# Patient Record
Sex: MALE | Race: OTHER | HISPANIC OR LATINO | URBAN - METROPOLITAN AREA
[De-identification: names, ages, dates, MRNs, and addresses within clinical notes are randomized per-mention and may not be internally consistent; named-entity substitution may affect disease eponyms.]

---

## 2020-02-02 ENCOUNTER — EMERGENCY (EMERGENCY)
Facility: HOSPITAL | Age: 60
LOS: 1 days | Discharge: HOME | End: 2020-02-02
Attending: EMERGENCY MEDICINE
Payer: MEDICAID

## 2020-02-02 VITALS
HEIGHT: 70 IN | DIASTOLIC BLOOD PRESSURE: 8 MMHG | SYSTOLIC BLOOD PRESSURE: 124 MMHG | HEART RATE: 50 BPM | RESPIRATION RATE: 18 BRPM | TEMPERATURE: 98 F | WEIGHT: 175.05 LBS | OXYGEN SATURATION: 97 %

## 2020-02-02 VITALS
RESPIRATION RATE: 18 BRPM | HEART RATE: 52 BPM | TEMPERATURE: 98 F | DIASTOLIC BLOOD PRESSURE: 82 MMHG | OXYGEN SATURATION: 97 % | SYSTOLIC BLOOD PRESSURE: 138 MMHG

## 2020-02-02 PROCEDURE — 99283 EMERGENCY DEPT VISIT LOW MDM: CPT

## 2020-02-02 RX ORDER — VALACYCLOVIR 500 MG/1
1000 TABLET, FILM COATED ORAL ONCE
Refills: 0 | Status: COMPLETED | OUTPATIENT
Start: 2020-02-02 | End: 2020-02-02

## 2020-02-02 RX ORDER — VALACYCLOVIR 500 MG/1
1 TABLET, FILM COATED ORAL
Qty: 21 | Refills: 0
Start: 2020-02-02 | End: 2020-02-08

## 2020-02-02 RX ORDER — IBUPROFEN 200 MG
600 TABLET ORAL ONCE
Refills: 0 | Status: COMPLETED | OUTPATIENT
Start: 2020-02-02 | End: 2020-02-02

## 2020-02-02 RX ADMIN — VALACYCLOVIR 1000 MILLIGRAM(S): 500 TABLET, FILM COATED ORAL at 14:09

## 2020-02-02 RX ADMIN — Medication 600 MILLIGRAM(S): at 14:09

## 2020-02-02 NOTE — ED PROVIDER NOTE - PATIENT PORTAL LINK FT
You can access the FollowMyHealth Patient Portal offered by [facility] by registering at the following website: [email or web address] By joining [email or web address] FollowMyHealth portal, you will also be able to view your health information using other applications (apps) compatible with our system.

## 2020-02-02 NOTE — ED PROVIDER NOTE - PHYSICAL EXAMINATION
Attending note. Rash in right flank-fascicular with a red base which our group consistent with shingles.

## 2020-02-02 NOTE — ED PROVIDER NOTE - OBJECTIVE STATEMENT
Attending note. Patient was seen in the fast track or room #4. She is complaining of rash on the right flank today. Patient started having itching and burning in the area yesterday. He denies any fever. He has no other rash.

## 2020-02-02 NOTE — ED PROVIDER NOTE - NSFOLLOWUPINSTRUCTIONS_ED_ALL_ED_FT
Rx - Valrex 1000mg 3x daily for 7 days.  Avoid elderly, infants, pregnant and people getting chemotherapy.  Must return to ER if develop headache, cough or difficulty breathing or rash on other parts of body. Rx - Valrex 1000mg 3x daily for 7 days.  Avoid elderly, infants, pregnant and people getting chemotherapy.  Must return to ER if develop headache, cough or difficulty breathing or rash on other parts of body.  Keep wound clean and dry washing with soap and water gently.  Bacitracin ointment to wound twice a day.  Take Motrin 600mg every 8hours for pain with food.  Or Tylenol 650mg every 6hours for pain.  Follow up with your primary Dr. for reevaluation in 2days.

## 2020-02-02 NOTE — ED ADULT NURSE NOTE - OBJECTIVE STATEMENT
59 male denies medical hx c/o right flank pain with associated rash. Began yesterday as a "itchy, prickling" pain and noticed rash this morning. On exam red macules noted to right side, isolated to the flank, not crossing the midline. Denies fever/chills, cough, abd pain, N/V/D. no recent travel. VSS

## 2020-10-03 NOTE — ED PROVIDER NOTE - NS ED ATTENDING STATEMENT MOD
I have personally performed a face to face diagnostic evaluation on this patient. I have reviewed the ACP note and agree with the history, exam and plan of care, except as noted. Monthly or less